# Patient Record
Sex: MALE | URBAN - METROPOLITAN AREA
[De-identification: names, ages, dates, MRNs, and addresses within clinical notes are randomized per-mention and may not be internally consistent; named-entity substitution may affect disease eponyms.]

---

## 2021-04-20 ENCOUNTER — AMBULATORY - HEALTHEAST (OUTPATIENT)
Dept: FAMILY MEDICINE | Facility: CLINIC | Age: 21
End: 2021-04-20

## 2021-04-20 ENCOUNTER — VIRTUAL VISIT (OUTPATIENT)
Dept: FAMILY MEDICINE | Facility: CLINIC | Age: 21
End: 2021-04-20
Payer: COMMERCIAL

## 2021-04-20 DIAGNOSIS — Z11.52 ENCOUNTER FOR SCREENING FOR COVID-19: ICD-10-CM

## 2021-04-20 DIAGNOSIS — J02.9 SORE THROAT: ICD-10-CM

## 2021-04-20 DIAGNOSIS — Z20.818 STREP THROAT EXPOSURE: Primary | ICD-10-CM

## 2021-04-20 PROCEDURE — 99203 OFFICE O/P NEW LOW 30 MIN: CPT | Mod: 95 | Performed by: PHYSICIAN ASSISTANT

## 2021-04-20 RX ORDER — AZITHROMYCIN 250 MG/1
TABLET, FILM COATED ORAL
Qty: 6 TABLET | Refills: 0 | Status: SHIPPED | OUTPATIENT
Start: 2021-04-20 | End: 2021-04-25

## 2021-04-20 NOTE — PROGRESS NOTES
Magnus is a 20 year old who is being evaluated via a billable telephone visit.      What phone number would you like to be contacted at? [phone]  How would you like to obtain your AVS? Mail a copy    Assessment & Plan     Strep throat exposure  Sore throat  Encounter for screening for COVID-19  Patient is a 20-year-old male who presents to virtual visit due to exposure to strep throat from girlfriend and sore throat with fatigue, nasal congestion, chills/sweats, and bilateral ear pain ongoing since yesterday.  Low suspicion for significant respiratory distress or swelling as patient is able to speak in full sentences.  Recommended COVID-19 PCR and rapid strep testing.  Patient agreeable.  Orders placed.  Given exposure to strep with current symptoms, patient prescribed azithromycin.  Discussed symptomatic management with Tylenol/ibuprofen, rest, and hydration.  Recommended quarantining until results available.  Discuss symptoms that warrant urgent/emergent follow-up.  - azithromycin (ZITHROMAX) 250 MG tablet; Take 2 tablets (500 mg) by mouth daily for 1 day, THEN 1 tablet (250 mg) daily for 4 days.  - Streptococcus A Rapid Scr w Reflx to PCR; Future  - Symptomatic COVID-19 Virus (Coronavirus) by PCR; Future       See Patient Instructions    No follow-ups on file.    Sima Rodrigues PA-C  [facility] RED Agudelo is a 20 year old who presents for the following health issues     HPI     Acute Illness  Acute illness concerns: Patient is in [place] for work today and woke up this morning with sore throat. Patient is from [place].  Symptoms are consistent with prior episodes of strep throat.  Onset/Duration: 1 day  Symptoms:  Fever: no  Chills/Sweats: YES- sweats  Headache (location?): YES  Sinus Pressure: YES  Conjunctivitis:  no  Ear Pain: YES: bilateral  Rhinorrhea: YES  Congestion: YES  Sore Throat: YES  Cough: Mild-productive of green sputum from nasal discarge   Wheeze: No   Decreased  Appetite: YES- less PO  Nausea: no  Vomiting: no  Diarrhea: no  Dysuria/Freq.: no  Dysuria or Hematuria: no  Fatigue/Achiness: YES  Sick/Strep Exposure: YES- Girl friend with strep ongoing for two days, he notes that he had Covid about 3 months ago  Therapies tried and outcome: None        Objective           Vitals:  No vitals were obtained today due to virtual visit.    Physical Exam   healthy, alert and no distress  PSYCH: Alert and oriented times 3; coherent speech, normal   rate and volume, able to articulate logical thoughts, able   to abstract reason, no tangential thoughts, no hallucinations   or delusions  His affect is normal  RESP: No cough, no audible wheezing, able to talk in full sentences  Remainder of exam unable to be completed due to telephone visits    Rapid strep with reflex, COVID-19 PCR pending          Phone call duration: 8 minutes

## 2021-04-20 NOTE — PATIENT INSTRUCTIONS
Maikel Agudelo,     Your symptoms are concerning for strep throat, COVID-19 or other viral illness.  A RAPID STREP AND COVID-19 test have been ordered for you.  You will be contacted within 24 hours to schedule your test.  After completing the test, results should be available within 1-2 days and will be sent to your MyChart.  You may use Tylenol for fever and pain management and Robitussin-DM/Mucinex DM for cough and congestion.  Make sure to get plenty of rest and stay hydrated.      If you have severe symptoms such as chest pain, wheezing, shortness of breath, or fevers that you cannot control, please go to the emergency department.  It is important that you follow self-isolation guidelines as listed below.    Please reach out with any questions or concerns.  Take care,  Sima Rodrigues PA-C